# Patient Record
Sex: MALE | Race: WHITE | NOT HISPANIC OR LATINO | Employment: STUDENT | URBAN - METROPOLITAN AREA
[De-identification: names, ages, dates, MRNs, and addresses within clinical notes are randomized per-mention and may not be internally consistent; named-entity substitution may affect disease eponyms.]

---

## 2023-10-20 PROBLEM — M76.51 PATELLAR TENDONITIS OF RIGHT KNEE: Status: ACTIVE | Noted: 2023-10-20

## 2023-10-20 PROBLEM — M25.362 INSTABILITY OF LEFT KNEE JOINT: Status: ACTIVE | Noted: 2023-10-20

## 2023-10-20 RX ORDER — ADAPALENE 0.1 G/100G
CREAM TOPICAL
COMMUNITY
Start: 2018-05-23

## 2023-10-20 RX ORDER — INDOMETHACIN 75 MG/1
1 CAPSULE, EXTENDED RELEASE ORAL
COMMUNITY
Start: 2018-09-26

## 2023-10-20 RX ORDER — LEVALBUTEROL TARTRATE 45 UG/1
AEROSOL, METERED ORAL
COMMUNITY
Start: 2018-05-18

## 2023-10-23 ENCOUNTER — OFFICE VISIT (OUTPATIENT)
Dept: ORTHOPEDIC SURGERY | Facility: HOSPITAL | Age: 23
End: 2023-10-23
Payer: COMMERCIAL

## 2023-10-23 VITALS — WEIGHT: 189 LBS | BODY MASS INDEX: 26.37 KG/M2

## 2023-10-23 DIAGNOSIS — M25.561 ACUTE PAIN OF RIGHT KNEE: Primary | ICD-10-CM

## 2023-10-23 PROCEDURE — 99213 OFFICE O/P EST LOW 20 MIN: CPT | Performed by: ORTHOPAEDIC SURGERY

## 2023-10-23 PROCEDURE — 1036F TOBACCO NON-USER: CPT | Performed by: ORTHOPAEDIC SURGERY

## 2023-10-23 NOTE — LETTER
October 23, 2023     Patient: Chivo Anne   YOB: 2000   Date of Visit: 10/23/2023       To Whom it May Concern:    Chivo Anne was seen in my clinic on 10/23/2023.     If you have any questions or concerns, please don't hesitate to call.         Sincerely,          Leonid Campuzano MD        CC: No Recipients

## 2023-10-23 NOTE — PROGRESS NOTES
Subjective    Patient ID: Chivo Anne is a 22 y.o. male.    Chief Complaint: Follow-up of the Left Knee     Last Surgery Date: 5-    HPI: Chivo returns for follow-up nearly a year and a half status post left knee ACL reconstruction and lateral meniscus repair.  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  Over the summer he has noted a little bit of patellofemoral and patella tendon pain.  He had a little bit of swelling but resolved.  Currently he is not particularly sore.  He was doing a lot of lunges and did a 14 mile hike in the [place].  He denies any mechanical catching or instability.    Of note, he did experience some left lower extremity radicular symptoms and low back pain treated conservatively by his physical therapist.  He has not been stretching his hamstrings much.  His low back pain has resolved.    Objective :  22 year-old male in no acute distress. Alert and oriented × 3.  Normal tandem gait with neutral alignment. Symmetric single-leg step down bilaterally.  Skin intact bilateral lower extremities. No erythema.  Left negative effusion.  Mild tenderness over the patellar tendon.  Knee incisions are well healed and mature.  Bilateral hips with full and symmetric motion. No tenderness.  Right knee with full range of motion. Excellent quadriceps contraction. Stable Lachman and negative joint line tenderness.  Left knee with full range of motion. Excellent quadriceps contraction. Stable Lachman and negative joint line tenderness.  Bilateral lower extremity compartments supple.  5 out of 5 distal motor strength bilaterally.  L4-S1 sensation intact bilaterally.  2+ DP/PT pulses bilaterally.    No new imaging performed today.    Assessment/Plan :  Right knee pain    We discussed the findings most consistent with early patellofemoral chondromalacia or patella tendinitis.  His Lachman is stable.  No joint line tenderness.  Again he denies any  mechanical catching or instability.  We discussed his lumbar pathology may have contributed.  We reviewed hamstring and calf stretching program.  We discussed treatment modifications.  Certainly if symptoms persist we can repeat imaging to assess his articular cartilage and intra-articular structures.  He agrees with this plan.  His questions were answered.

## 2023-12-27 ENCOUNTER — TELEPHONE (OUTPATIENT)
Dept: ORTHOPEDIC SURGERY | Facility: HOSPITAL | Age: 23
End: 2023-12-27
Payer: COMMERCIAL

## 2023-12-27 NOTE — TELEPHONE ENCOUNTER
Patients mom called, states when he was seen last by Dr. Campuzano that if he did not see any improvement for him  to  call so he could get more  imaging. Mom is requesting that the order be emailed to her so they can go to an outside  facility.

## 2023-12-28 ENCOUNTER — TELEPHONE (OUTPATIENT)
Dept: ORTHOPEDIC SURGERY | Facility: HOSPITAL | Age: 23
End: 2023-12-28
Payer: COMMERCIAL

## 2023-12-28 DIAGNOSIS — M25.362 OTHER INSTABILITY, LEFT KNEE: Primary | ICD-10-CM

## 2023-12-28 DIAGNOSIS — M25.561 ACUTE PAIN OF RIGHT KNEE: ICD-10-CM

## 2023-12-28 DIAGNOSIS — M76.51 PATELLAR TENDONITIS OF RIGHT KNEE: Primary | ICD-10-CM

## 2023-12-28 DIAGNOSIS — M76.52 PATELLAR TENDINITIS, LEFT KNEE: ICD-10-CM

## 2023-12-28 NOTE — PROGRESS NOTES
Patient's mother contacted the office to let us know that he continues to have pain in his left knee.  His symptoms are currently limiting his ability to be active and participate in sports.    Given his failure to see improvement over the last several weeks with conservative care we will going to proceed with a left knee MRI to evaluate for intra-articular pathology as well as to evaluate his patellar tendon.  Once the MRI has been completed we will have the patient and his family contact the office so that we may review the results with him by phone.

## 2024-01-15 ENCOUNTER — ANCILLARY PROCEDURE (OUTPATIENT)
Dept: RADIOLOGY | Facility: CLINIC | Age: 24
End: 2024-01-15
Payer: COMMERCIAL

## 2024-01-15 DIAGNOSIS — M76.52 PATELLAR TENDINITIS, LEFT KNEE: ICD-10-CM

## 2024-01-15 DIAGNOSIS — M76.52 PATELLAR TENDINITIS, LEFT KNEE: Primary | ICD-10-CM

## 2024-01-15 PROCEDURE — 73564 X-RAY EXAM KNEE 4 OR MORE: CPT | Mod: LEFT SIDE | Performed by: RADIOLOGY

## 2024-01-15 PROCEDURE — 73564 X-RAY EXAM KNEE 4 OR MORE: CPT | Mod: LT

## 2024-01-22 ENCOUNTER — OFFICE VISIT (OUTPATIENT)
Dept: ORTHOPEDIC SURGERY | Facility: HOSPITAL | Age: 24
End: 2024-01-22
Payer: COMMERCIAL

## 2024-01-22 VITALS — BODY MASS INDEX: 28.63 KG/M2 | HEIGHT: 70 IN | WEIGHT: 200 LBS

## 2024-01-22 DIAGNOSIS — M25.462 EFFUSION OF LEFT KNEE: Primary | ICD-10-CM

## 2024-01-22 PROCEDURE — 99214 OFFICE O/P EST MOD 30 MIN: CPT | Performed by: PHYSICIAN ASSISTANT

## 2024-01-22 PROCEDURE — 1036F TOBACCO NON-USER: CPT | Performed by: PHYSICIAN ASSISTANT

## 2024-01-22 RX ORDER — CELECOXIB 200 MG/1
200 CAPSULE ORAL DAILY
Qty: 30 CAPSULE | Refills: 2 | Status: SHIPPED | OUTPATIENT
Start: 2024-01-22 | End: 2024-04-21

## 2024-01-22 NOTE — PROGRESS NOTES
Subjective    Patient ID: Chivo Anne is a 23 y.o. male.    Chief Complaint: Follow-up of the Left Knee           HPI:  Chivo Anne is a 23 y.o. male returns today for his left knee.  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  He states that following practices he has swelling in his knee that typically occurs about 1 to 2 days after his workouts.  He has not had any new injury or trauma.  He denies any significant pain or discomfort.  Has not had any feelings of instability.  To this point his symptoms not limited his ability to play.  He has been using Advil and ice which do seem to help.  He recently had an MRI completed.  He does have a history of left knee ACL reconstruction with patella tendon autograft and lateral meniscus repair performed in June 2022.    ROS  Constitutional: No fever, no chills, not feeling tired, no recent weight gain and no recent weight loss  ENT: No nosebleeds  Cardiovascular: No chest pain  Respiratory: No shortness of breath and no cough  Gastrointestinal: No abdominal pain, no nausea, no diarrhea, and no vomiting  Musculoskeletal: No arthralgias  Integumentary: No rashes and no skin lesions  Neurological: No headache  Psychiatric: No sleep disturbances no depression  Endocrine: No muscle weakness and no muscle cramps  Hematologic/lymphatic: No swelling glands and no tendency for easy bruising    Past Medical History:   Diagnosis Date    Other specified health status     No pertinent past medical history        Past Surgical History:   Procedure Laterality Date    OTHER SURGICAL HISTORY  05/23/2022    No history of surgery          Current Outpatient Medications:     adapalene (Differin) 0.1 % cream, Apply topically. As directed., Disp: , Rfl:     celecoxib (CeleBREX) 200 mg capsule, Take 1 capsule (200 mg) by mouth once daily., Disp: 30 capsule, Rfl: 2    diclofenac sodium 1 % kit, Place on the skin once daily. APPLY TO LOWER EXTREMITIES, 4 GM OF GEL TO  AFFECTED AREA 4 TIMES DAILY. DO NOT APPLY MORE THAN 16 GM DAILY TO ANY ONE AFFECTED JOINT., Disp: , Rfl:     indomethacin SR (Indocin SR) 75 mg ER capsule, Take 1 capsule (75 mg) by mouth once daily with a meal., Disp: , Rfl:     levalbuterol (Xopenex) 45 mcg/actuation inhaler, Inhale. As directed., Disp: , Rfl:      No Known Allergies     Social Connections: Not on file          Objective   23-year-old male well appearing in no acute distress. Alert and oriented ×3.  Skin intact bilateral lower extremities.   Normal tandem gait. Coordination and balance intact.  Bilateral lower extremity compartments supple.  5 out of 5 distal motor strength bilaterally.  L4 through S1 sensation intact bilaterally.  2+ DP/PT pulses bilaterally.  Left knee with well-healed surgical scars over the anterior aspect.  Slight quad atrophy in the left when compared to the right.  Active range of motion of both knees is 0 to 130 degrees.  Trace effusion in the left knee.  Minimal tenderness along the lateral joint line of the left knee.  Negative oJhn's.  Negative Lachman's.    Image Results:  MRI of the left knee from an outside facility dated 1/18/2024 was reviewed today.  This showed postsurgical changes consistent with prior ACL reconstruction.  The ACL graft itself is intact.  He does have some signal within the lateral meniscus consistent with his history of prior lateral meniscus repair.  Some fibrosis noted anterior to the ACL graft.  Patellar tendon is intact.      Assessment/Plan   Encounter Diagnoses:  Effusion of left knee    Orders Placed This Encounter    celecoxib (CeleBREX) 200 mg capsule       I discussed with him that he may have a little bit of fraying of his lateral meniscus.  So far his symptoms have not limited his ability to play.  His goal is to try to finish the season.  I recommended that he continue ice and utilize a compression sleeve during play.  He is going to discontinue over-the-counter medicines and  begin Celebrex 200 mg once a day.  He should continue his home exercise program focusing on flexibility and hip and quad strength.  He will also continue to utilize his functional brace while playing.  If his symptoms should start to limit his ability to play lacrosse or if they worsen by the end of the sports season we will have further discussions regarding arthroscopy and partial lateral meniscectomy.  He verbalized understanding and agreed with the plan.    This office note was dictated using Dragon voice to text software and was not proofread for spelling or grammatical errors